# Patient Record
Sex: MALE | Race: BLACK OR AFRICAN AMERICAN | NOT HISPANIC OR LATINO
[De-identification: names, ages, dates, MRNs, and addresses within clinical notes are randomized per-mention and may not be internally consistent; named-entity substitution may affect disease eponyms.]

---

## 2020-04-26 ENCOUNTER — MESSAGE (OUTPATIENT)
Age: 25
End: 2020-04-26

## 2024-04-18 ENCOUNTER — EMERGENCY (EMERGENCY)
Facility: HOSPITAL | Age: 29
LOS: 1 days | Discharge: HOME | End: 2024-04-18
Attending: STUDENT IN AN ORGANIZED HEALTH CARE EDUCATION/TRAINING PROGRAM | Admitting: STUDENT IN AN ORGANIZED HEALTH CARE EDUCATION/TRAINING PROGRAM
Payer: COMMERCIAL

## 2024-04-18 VITALS
HEART RATE: 84 BPM | DIASTOLIC BLOOD PRESSURE: 63 MMHG | WEIGHT: 190.04 LBS | RESPIRATION RATE: 18 BRPM | OXYGEN SATURATION: 99 % | SYSTOLIC BLOOD PRESSURE: 134 MMHG | TEMPERATURE: 98 F | HEIGHT: 71 IN

## 2024-04-18 DIAGNOSIS — R26.2 DIFFICULTY IN WALKING, NOT ELSEWHERE CLASSIFIED: ICD-10-CM

## 2024-04-18 DIAGNOSIS — H53.9 UNSPECIFIED VISUAL DISTURBANCE: ICD-10-CM

## 2024-04-18 DIAGNOSIS — R42 DIZZINESS AND GIDDINESS: ICD-10-CM

## 2024-04-18 DIAGNOSIS — H53.8 OTHER VISUAL DISTURBANCES: ICD-10-CM

## 2024-04-18 LAB
ALBUMIN SERPL ELPH-MCNC: 4.6 G/DL — SIGNIFICANT CHANGE UP (ref 3.3–5)
ALP SERPL-CCNC: 71 U/L — SIGNIFICANT CHANGE UP (ref 40–120)
ALT FLD-CCNC: 21 U/L — SIGNIFICANT CHANGE UP (ref 10–45)
ANION GAP SERPL CALC-SCNC: 13 MMOL/L — SIGNIFICANT CHANGE UP (ref 5–17)
APTT BLD: 35 SEC — SIGNIFICANT CHANGE UP (ref 24.5–35.6)
AST SERPL-CCNC: 32 U/L — SIGNIFICANT CHANGE UP (ref 10–40)
BASOPHILS # BLD AUTO: 0.04 K/UL — SIGNIFICANT CHANGE UP (ref 0–0.2)
BASOPHILS NFR BLD AUTO: 0.4 % — SIGNIFICANT CHANGE UP (ref 0–2)
BILIRUB SERPL-MCNC: 0.8 MG/DL — SIGNIFICANT CHANGE UP (ref 0.2–1.2)
BUN SERPL-MCNC: 17 MG/DL — SIGNIFICANT CHANGE UP (ref 7–23)
CALCIUM SERPL-MCNC: 10 MG/DL — SIGNIFICANT CHANGE UP (ref 8.4–10.5)
CHLORIDE SERPL-SCNC: 99 MMOL/L — SIGNIFICANT CHANGE UP (ref 96–108)
CO2 SERPL-SCNC: 26 MMOL/L — SIGNIFICANT CHANGE UP (ref 22–31)
CREAT SERPL-MCNC: 1.06 MG/DL — SIGNIFICANT CHANGE UP (ref 0.5–1.3)
EGFR: 99 ML/MIN/1.73M2 — SIGNIFICANT CHANGE UP
EOSINOPHIL # BLD AUTO: 0.14 K/UL — SIGNIFICANT CHANGE UP (ref 0–0.5)
EOSINOPHIL NFR BLD AUTO: 1.3 % — SIGNIFICANT CHANGE UP (ref 0–6)
GLUCOSE SERPL-MCNC: 126 MG/DL — HIGH (ref 70–99)
HCT VFR BLD CALC: 43.2 % — SIGNIFICANT CHANGE UP (ref 39–50)
HGB BLD-MCNC: 13.9 G/DL — SIGNIFICANT CHANGE UP (ref 13–17)
IMM GRANULOCYTES NFR BLD AUTO: 0.4 % — SIGNIFICANT CHANGE UP (ref 0–0.9)
INR BLD: 0.96 — SIGNIFICANT CHANGE UP (ref 0.85–1.18)
LYMPHOCYTES # BLD AUTO: 4.35 K/UL — HIGH (ref 1–3.3)
LYMPHOCYTES # BLD AUTO: 41.3 % — SIGNIFICANT CHANGE UP (ref 13–44)
MCHC RBC-ENTMCNC: 29.1 PG — SIGNIFICANT CHANGE UP (ref 27–34)
MCHC RBC-ENTMCNC: 32.2 GM/DL — SIGNIFICANT CHANGE UP (ref 32–36)
MCV RBC AUTO: 90.6 FL — SIGNIFICANT CHANGE UP (ref 80–100)
MONOCYTES # BLD AUTO: 0.96 K/UL — HIGH (ref 0–0.9)
MONOCYTES NFR BLD AUTO: 9.1 % — SIGNIFICANT CHANGE UP (ref 2–14)
NEUTROPHILS # BLD AUTO: 4.99 K/UL — SIGNIFICANT CHANGE UP (ref 1.8–7.4)
NEUTROPHILS NFR BLD AUTO: 47.5 % — SIGNIFICANT CHANGE UP (ref 43–77)
NRBC # BLD: 0 /100 WBCS — SIGNIFICANT CHANGE UP (ref 0–0)
PLATELET # BLD AUTO: 332 K/UL — SIGNIFICANT CHANGE UP (ref 150–400)
POTASSIUM SERPL-MCNC: 4.2 MMOL/L — SIGNIFICANT CHANGE UP (ref 3.5–5.3)
POTASSIUM SERPL-SCNC: 4.2 MMOL/L — SIGNIFICANT CHANGE UP (ref 3.5–5.3)
PROT SERPL-MCNC: 7.5 G/DL — SIGNIFICANT CHANGE UP (ref 6–8.3)
PROTHROM AB SERPL-ACNC: 11 SEC — SIGNIFICANT CHANGE UP (ref 9.5–13)
RBC # BLD: 4.77 M/UL — SIGNIFICANT CHANGE UP (ref 4.2–5.8)
RBC # FLD: 12 % — SIGNIFICANT CHANGE UP (ref 10.3–14.5)
SODIUM SERPL-SCNC: 138 MMOL/L — SIGNIFICANT CHANGE UP (ref 135–145)
TROPONIN T, HIGH SENSITIVITY RESULT: <6 NG/L — SIGNIFICANT CHANGE UP (ref 0–51)
WBC # BLD: 10.52 K/UL — HIGH (ref 3.8–10.5)
WBC # FLD AUTO: 10.52 K/UL — HIGH (ref 3.8–10.5)

## 2024-04-18 PROCEDURE — 85610 PROTHROMBIN TIME: CPT

## 2024-04-18 PROCEDURE — 0042T: CPT | Mod: MC

## 2024-04-18 PROCEDURE — 85025 COMPLETE CBC W/AUTO DIFF WBC: CPT

## 2024-04-18 PROCEDURE — 36415 COLL VENOUS BLD VENIPUNCTURE: CPT

## 2024-04-18 PROCEDURE — 70498 CT ANGIOGRAPHY NECK: CPT | Mod: MC

## 2024-04-18 PROCEDURE — 80053 COMPREHEN METABOLIC PANEL: CPT

## 2024-04-18 PROCEDURE — 96374 THER/PROPH/DIAG INJ IV PUSH: CPT | Mod: XU

## 2024-04-18 PROCEDURE — 96375 TX/PRO/DX INJ NEW DRUG ADDON: CPT | Mod: XU

## 2024-04-18 PROCEDURE — 70498 CT ANGIOGRAPHY NECK: CPT | Mod: 26,MC

## 2024-04-18 PROCEDURE — 70496 CT ANGIOGRAPHY HEAD: CPT | Mod: MC

## 2024-04-18 PROCEDURE — 99285 EMERGENCY DEPT VISIT HI MDM: CPT

## 2024-04-18 PROCEDURE — 70450 CT HEAD/BRAIN W/O DYE: CPT | Mod: MC

## 2024-04-18 PROCEDURE — 70496 CT ANGIOGRAPHY HEAD: CPT | Mod: 26,MC

## 2024-04-18 PROCEDURE — 85730 THROMBOPLASTIN TIME PARTIAL: CPT

## 2024-04-18 PROCEDURE — 70450 CT HEAD/BRAIN W/O DYE: CPT | Mod: 26,59,MC

## 2024-04-18 PROCEDURE — 99285 EMERGENCY DEPT VISIT HI MDM: CPT | Mod: 25

## 2024-04-18 PROCEDURE — 84484 ASSAY OF TROPONIN QUANT: CPT

## 2024-04-18 RX ORDER — METOCLOPRAMIDE HCL 10 MG
10 TABLET ORAL ONCE
Refills: 0 | Status: COMPLETED | OUTPATIENT
Start: 2024-04-18 | End: 2024-04-18

## 2024-04-18 RX ORDER — DIPHENHYDRAMINE HCL 50 MG
25 CAPSULE ORAL ONCE
Refills: 0 | Status: COMPLETED | OUTPATIENT
Start: 2024-04-18 | End: 2024-04-18

## 2024-04-18 RX ORDER — SUMATRIPTAN SUCCINATE 4 MG/.5ML
6 INJECTION, SOLUTION SUBCUTANEOUS ONCE
Refills: 0 | Status: COMPLETED | OUTPATIENT
Start: 2024-04-18 | End: 2024-04-18

## 2024-04-18 RX ORDER — SODIUM CHLORIDE 9 MG/ML
1000 INJECTION INTRAMUSCULAR; INTRAVENOUS; SUBCUTANEOUS ONCE
Refills: 0 | Status: COMPLETED | OUTPATIENT
Start: 2024-04-18 | End: 2024-04-18

## 2024-04-18 RX ADMIN — SUMATRIPTAN SUCCINATE 6 MILLIGRAM(S): 4 INJECTION, SOLUTION SUBCUTANEOUS at 22:17

## 2024-04-18 RX ADMIN — Medication 25 MILLIGRAM(S): at 22:16

## 2024-04-18 RX ADMIN — Medication 104 MILLIGRAM(S): at 22:16

## 2024-04-18 RX ADMIN — SODIUM CHLORIDE 1000 MILLILITER(S): 9 INJECTION INTRAMUSCULAR; INTRAVENOUS; SUBCUTANEOUS at 22:16

## 2024-04-18 NOTE — ED ADULT NURSE NOTE - NSFALLUNIVINTERV_ED_ALL_ED
Bed/Stretcher in lowest position, wheels locked, appropriate side rails in place/Call bell, personal items and telephone in reach/Instruct patient to call for assistance before getting out of bed/chair/stretcher/Non-slip footwear applied when patient is off stretcher/[place] to call system/Physically safe environment - no spills, clutter or unnecessary equipment/Purposeful proactive rounding/Room/bathroom lighting operational, light cord in reach

## 2024-04-18 NOTE — CONSULT NOTE ADULT - SUBJECTIVE AND OBJECTIVE BOX
**STROKE CODE CONSULT NOTE**    Last known well time 2100    HPI: 29y Male with no PMHx     T(C): 36.7 (04-18-24 @ 21:22), Max: 36.7 (04-18-24 @ 21:22)  HR: 84 (04-18-24 @ 21:22) (84 - 84)  BP: 134/63 (04-18-24 @ 21:22) (134/63 - 134/63)  RR: 18 (04-18-24 @ 21:22) (18 - 18)  SpO2: 99% (04-18-24 @ 21:22) (99% - 99%)    PAST MEDICAL & SURGICAL HISTORY:  Asthma      No significant past surgical history          FAMILY HISTORY:      SOCIAL HISTORY:   Smoking status: marijuana use   Drinking: occasional  Drug Use: marijuana use     ROS:  Constitutional: No fever, weight loss or fatigue  Eyes: No eye pain or discharge  ENMT:  No difficulty hearing, tinnitus; No sinus or throat pain  Neck: No pain or stiffness  Respiratory: No cough, wheezing, chills or hemoptysis  Cardiovascular: No chest pain, palpitations, shortness of breath, or leg swelling  Gastrointestinal: No abdominal pain. No nausea, vomiting  Genitourinary: No dysuria, frequency, hematuria or incontinence  Neurological: As per HPI    MEDICATIONS  (STANDING):    MEDICATIONS  (PRN):    Allergies    No Known Allergies    Intolerances      Vital Signs Last 24 Hrs  T(C): 36.7 (18 Apr 2024 21:22), Max: 36.7 (18 Apr 2024 21:22)  T(F): 98.1 (18 Apr 2024 21:22), Max: 98.1 (18 Apr 2024 21:22)  HR: 84 (18 Apr 2024 21:22) (84 - 84)  BP: 134/63 (18 Apr 2024 21:22) (134/63 - 134/63)  BP(mean): --  RR: 18 (18 Apr 2024 21:22) (18 - 18)  SpO2: 99% (18 Apr 2024 21:22) (99% - 99%)    Parameters below as of 18 Apr 2024 21:22  Patient On (Oxygen Delivery Method): room air        Physical exam:  Constitutional: No acute distress, conversant  Eyes: Anicteric sclerae, moist conjunctivae, see below for CNs  Neck: trachea midline  Cardiovascular: Regular rate and rhythm  Pulmonary:  No use of accessory muscles  GI: Abdomen soft, non-distended, non-tender  Extremities: Radial and DP pulses +2, no edema    Neurologic:  -Mental status: Awake, alert, oriented to person, place, and time. Speech is fluent with intact naming, repetition, and comprehension, no dysarthria. Recent and remote memory intact. Follows commands. Attention/concentration intact. Fund of knowledge appropriate.  -Cranial nerves:   II: Visual fields are full to confrontation.  III, IV, VI: Extraocular movements are intact without nystagmus. Pupils equally round and reactive to light  V:  Facial sensation V1-V3 equal and intact   VII: Face is symmetric with normal eye closure and smile  VIII: Hearing is bilaterally intact to finger rub  IX, X: Uvula is midline and soft palate rises symmetrically  XI: Head turning and shoulder shrug are intact.  XII: Tongue protrudes midline  Motor: Normal bulk and tone. No pronator drift. Strength bilateral upper extremity 5/5, bilateral lower extremities 5/5.  Rapid alternating movements intact and symmetric  Sensation: Intact to light touch bilaterally. No neglect or extinction on double simultaneous testing.  Coordination: No dysmetria on finger-to-nose and heel-to-shin bilaterally  Reflexes: Downgoing toes bilaterally   Gait: Narrow gait and steady    NIHSS: 0    Fingerstick Blood Glucose: CAPILLARY BLOOD GLUCOSE        LABS:                        13.9   10.52 )-----------( 332      ( 18 Apr 2024 21:37 )             43.2     04-18    138  |  99  |  17  ----------------------------<  126<H>  4.2   |  26  |  1.06    Ca    10.0      18 Apr 2024 21:37    TPro  7.5  /  Alb  4.6  /  TBili  0.8  /  DBili  x   /  AST  32  /  ALT  21  /  AlkPhos  71  04-18    PT/INR - ( 18 Apr 2024 21:37 )   PT: 11.0 sec;   INR: 0.96          PTT - ( 18 Apr 2024 21:37 )  PTT:35.0 sec      Urinalysis Basic - ( 18 Apr 2024 21:37 )    Color: x / Appearance: x / SG: x / pH: x  Gluc: 126 mg/dL / Ketone: x  / Bili: x / Urobili: x   Blood: x / Protein: x / Nitrite: x   Leuk Esterase: x / RBC: x / WBC x   Sq Epi: x / Non Sq Epi: x / Bacteria: x        RADIOLOGY & ADDITIONAL STUDIES:    < from: CT Brain Stroke Protocol (04.18.24 @ 21:47) >  IMPRESSION:  No acute intracranial hemorrhage or demarcated territorial infarction.    < end of copied text >    < from: CT Brain Perfusion Maps Stroke (04.18.24 @ 21:48) >    COMPARISON: None    FINDINGS: The CBF <30% volume is 0 mL.  The Tmax > 6s volume is 16 mL   (left temporal and bilateral frontal lobes).  The mismatch volume is 16   mL. The mismatch ratio is infinite.    There is somepatient motion.    IMPRESSION: Abnormal CT perfusion as above with mismatch defect of 16 mL   within the left temporal and bilateral frontal lobes. Exam is limited by   motion.    < end of copied text >    < from: CT Angio Brain Stroke Protocol  w/ IV Cont (04.18.24 @ 21:49) >    IMPRESSION:    Intracranial CTA: No arterial steno-occlusive disease.    Extracranial CTA: No arterial steno-occlusive disease or evidence of   dissection.    < end of copied text >    -----------------------------------------------------------------------------------------------------------------  IV-tPA (Y/N):  N                            Bolus time:    Tenecteplase Dose Verification w/ RN:  Reason IV-tPA not given: non focal deficits NIHSS 0     **STROKE CODE CONSULT NOTE**    Last known well time 2100    HPI: 29y Male with no PMHx presents to [facility] ED for visual disturbances and associated unsteady gait.     T(C): 36.7 (04-18-24 @ 21:22), Max: 36.7 (04-18-24 @ 21:22)  HR: 84 (04-18-24 @ 21:22) (84 - 84)  BP: 134/63 (04-18-24 @ 21:22) (134/63 - 134/63)  RR: 18 (04-18-24 @ 21:22) (18 - 18)  SpO2: 99% (04-18-24 @ 21:22) (99% - 99%)    PAST MEDICAL & SURGICAL HISTORY:  Asthma      No significant past surgical history          FAMILY HISTORY:      SOCIAL HISTORY:   Smoking status: marijuana use   Drinking: occasional  Drug Use: marijuana use     ROS:  Constitutional: No fever, weight loss or fatigue  Eyes: No eye pain or discharge  ENMT:  No difficulty hearing, tinnitus; No sinus or throat pain  Neck: No pain or stiffness  Respiratory: No cough, wheezing, chills or hemoptysis  Cardiovascular: No chest pain, palpitations, shortness of breath, or leg swelling  Gastrointestinal: No abdominal pain. No nausea, vomiting  Genitourinary: No dysuria, frequency, hematuria or incontinence  Neurological: As per HPI    MEDICATIONS  (STANDING):    MEDICATIONS  (PRN):    Allergies    No Known Allergies    Intolerances      Vital Signs Last 24 Hrs  T(C): 36.7 (18 Apr 2024 21:22), Max: 36.7 (18 Apr 2024 21:22)  T(F): 98.1 (18 Apr 2024 21:22), Max: 98.1 (18 Apr 2024 21:22)  HR: 84 (18 Apr 2024 21:22) (84 - 84)  BP: 134/63 (18 Apr 2024 21:22) (134/63 - 134/63)  BP(mean): --  RR: 18 (18 Apr 2024 21:22) (18 - 18)  SpO2: 99% (18 Apr 2024 21:22) (99% - 99%)    Parameters below as of 18 Apr 2024 21:22  Patient On (Oxygen Delivery Method): room air        Physical exam:  Constitutional: No acute distress, conversant  Eyes: Anicteric sclerae, moist conjunctivae, see below for CNs  Neck: trachea midline  Cardiovascular: Regular rate and rhythm  Pulmonary:  No use of accessory muscles  GI: Abdomen soft, non-distended, non-tender  Extremities: Radial and DP pulses +2, no edema    Neurologic:  -Mental status: Awake, alert, oriented to person, place, and time. Speech is fluent with intact naming, repetition, and comprehension, no dysarthria. Recent and remote memory intact. Follows commands. Attention/concentration intact. Fund of knowledge appropriate.  -Cranial nerves:   II: Visual fields are full to confrontation.  III, IV, VI: Extraocular movements are intact without nystagmus. Pupils equally round and reactive to light  V:  Facial sensation V1-V3 equal and intact   VII: Face is symmetric with normal eye closure and smile  VIII: Hearing is bilaterally intact to finger rub  IX, X: Uvula is midline and soft palate rises symmetrically  XI: Head turning and shoulder shrug are intact.  XII: Tongue protrudes midline  Motor: Normal bulk and tone. No pronator drift. Strength bilateral upper extremity 5/5, bilateral lower extremities 5/5.  Rapid alternating movements intact and symmetric  Sensation: Intact to light touch bilaterally. No neglect or extinction on double simultaneous testing.  Coordination: No dysmetria on finger-to-nose and heel-to-shin bilaterally  Reflexes: Downgoing toes bilaterally   Gait: Narrow gait and steady    NIHSS: 0    Fingerstick Blood Glucose: CAPILLARY BLOOD GLUCOSE        LABS:                        13.9   10.52 )-----------( 332      ( 18 Apr 2024 21:37 )             43.2     04-18    138  |  99  |  17  ----------------------------<  126<H>  4.2   |  26  |  1.06    Ca    10.0      18 Apr 2024 21:37    TPro  7.5  /  Alb  4.6  /  TBili  0.8  /  DBili  x   /  AST  32  /  ALT  21  /  AlkPhos  71  04-18    PT/INR - ( 18 Apr 2024 21:37 )   PT: 11.0 sec;   INR: 0.96          PTT - ( 18 Apr 2024 21:37 )  PTT:35.0 sec      Urinalysis Basic - ( 18 Apr 2024 21:37 )    Color: x / Appearance: x / SG: x / pH: x  Gluc: 126 mg/dL / Ketone: x  / Bili: x / Urobili: x   Blood: x / Protein: x / Nitrite: x   Leuk Esterase: x / RBC: x / WBC x   Sq Epi: x / Non Sq Epi: x / Bacteria: x        RADIOLOGY & ADDITIONAL STUDIES:    < from: CT Brain Stroke Protocol (04.18.24 @ 21:47) >  IMPRESSION:  No acute intracranial hemorrhage or demarcated territorial infarction.    < end of copied text >    < from: CT Brain Perfusion Maps Stroke (04.18.24 @ 21:48) >    COMPARISON: None    FINDINGS: The CBF <30% volume is 0 mL.  The Tmax > 6s volume is 16 mL   (left temporal and bilateral frontal lobes).  The mismatch volume is 16   mL. The mismatch ratio is infinite.    There is somepatient motion.    IMPRESSION: Abnormal CT perfusion as above with mismatch defect of 16 mL   within the left temporal and bilateral frontal lobes. Exam is limited by   motion.    < end of copied text >    < from: CT Angio Brain Stroke Protocol  w/ IV Cont (04.18.24 @ 21:49) >    IMPRESSION:    Intracranial CTA: No arterial steno-occlusive disease.    Extracranial CTA: No arterial steno-occlusive disease or evidence of   dissection.    < end of copied text >    -----------------------------------------------------------------------------------------------------------------  IV-tPA (Y/N):  N                            Bolus time:    Tenecteplase Dose Verification w/ RN:  Reason IV-tPA not given: non focal deficits NIHSS 0     **STROKE CODE CONSULT NOTE**    Last known well time 2100    HPI: 29y Male with no PMHx presents to [facility] ED for visual disturbances and associated unsteady gait. Pt states he was leaving work when he suddenly felt like objects were shifting in his vision and because of this had to focus more on walking but denies unsteady gait. stroke code called at triage, NIHSS 0. /63. CTH negative, CTP with perfusion deficit in left temporal and bilateral frontal lobes; exam limited by motion artifact. CTA H/N negative for steno-occlusive disease. Pt with steady gait in ED. States his vision is not blurry but feels like it is hard to focus his vision. Denies zig zags or kaleidoscope vision. Denies room spinning sensation, weakness/numbness, headache, blurry vision, speech disturbance.     T(C): 36.7 (04-18-24 @ 21:22), Max: 36.7 (04-18-24 @ 21:22)  HR: 84 (04-18-24 @ 21:22) (84 - 84)  BP: 134/63 (04-18-24 @ 21:22) (134/63 - 134/63)  RR: 18 (04-18-24 @ 21:22) (18 - 18)  SpO2: 99% (04-18-24 @ 21:22) (99% - 99%)    PAST MEDICAL & SURGICAL HISTORY:  Asthma      No significant past surgical history          FAMILY HISTORY:      SOCIAL HISTORY:   Smoking status: marijuana use   Drinking: occasional  Drug Use: marijuana use     ROS:  Constitutional: No fever, weight loss or fatigue  Eyes: No eye pain or discharge  ENMT:  No difficulty hearing, tinnitus; No sinus or throat pain  Neck: No pain or stiffness  Respiratory: No cough, wheezing, chills or hemoptysis  Cardiovascular: No chest pain, palpitations, shortness of breath, or leg swelling  Gastrointestinal: No abdominal pain. No nausea, vomiting  Genitourinary: No dysuria, frequency, hematuria or incontinence  Neurological: As per HPI    MEDICATIONS  (STANDING):    MEDICATIONS  (PRN):    Allergies    No Known Allergies    Intolerances      Vital Signs Last 24 Hrs  T(C): 36.7 (18 Apr 2024 21:22), Max: 36.7 (18 Apr 2024 21:22)  T(F): 98.1 (18 Apr 2024 21:22), Max: 98.1 (18 Apr 2024 21:22)  HR: 84 (18 Apr 2024 21:22) (84 - 84)  BP: 134/63 (18 Apr 2024 21:22) (134/63 - 134/63)  BP(mean): --  RR: 18 (18 Apr 2024 21:22) (18 - 18)  SpO2: 99% (18 Apr 2024 21:22) (99% - 99%)    Parameters below as of 18 Apr 2024 21:22  Patient On (Oxygen Delivery Method): room air        Physical exam:  Constitutional: No acute distress, conversant  Eyes: Anicteric sclerae, moist conjunctivae, see below for CNs  Neck: trachea midline  Cardiovascular: Regular rate and rhythm  Pulmonary:  No use of accessory muscles  GI: Abdomen soft, non-distended, non-tender  Extremities: Radial and DP pulses +2, no edema    Neurologic:  -Mental status: Awake, alert, oriented to person, place, and time. Speech is fluent with intact naming, repetition, and comprehension, no dysarthria. Recent and remote memory intact. Follows commands. Attention/concentration intact. Fund of knowledge appropriate.  -Cranial nerves:   II: Visual fields are full to confrontation. Left eye decreased visual acuity compared to right eye.   III, IV, VI: Extraocular movements are intact without nystagmus. Pupils equally round and reactive to light  V:  Facial sensation V1-V3 equal and intact   VII: Face is symmetric with normal eye closure and smile  VIII: Hearing is bilaterally intact to finger rub  IX, X: Uvula is midline and soft palate rises symmetrically  XI: Head turning and shoulder shrug are intact.  XII: Tongue protrudes midline  Motor: Normal bulk and tone. No pronator drift. Strength bilateral upper extremity 5/5, bilateral lower extremities 5/5.  Rapid alternating movements intact and symmetric  Sensation: Intact to light touch bilaterally. No neglect or extinction on double simultaneous testing.  Coordination: No dysmetria on finger-to-nose and heel-to-shin bilaterally  Reflexes: Downgoing toes bilaterally   Gait: Narrow gait and steady    NIHSS: 0    Fingerstick Blood Glucose: CAPILLARY BLOOD GLUCOSE        LABS:                        13.9   10.52 )-----------( 332      ( 18 Apr 2024 21:37 )             43.2     04-18    138  |  99  |  17  ----------------------------<  126<H>  4.2   |  26  |  1.06    Ca    10.0      18 Apr 2024 21:37    TPro  7.5  /  Alb  4.6  /  TBili  0.8  /  DBili  x   /  AST  32  /  ALT  21  /  AlkPhos  71  04-18    PT/INR - ( 18 Apr 2024 21:37 )   PT: 11.0 sec;   INR: 0.96          PTT - ( 18 Apr 2024 21:37 )  PTT:35.0 sec      Urinalysis Basic - ( 18 Apr 2024 21:37 )    Color: x / Appearance: x / SG: x / pH: x  Gluc: 126 mg/dL / Ketone: x  / Bili: x / Urobili: x   Blood: x / Protein: x / Nitrite: x   Leuk Esterase: x / RBC: x / WBC x   Sq Epi: x / Non Sq Epi: x / Bacteria: x        RADIOLOGY & ADDITIONAL STUDIES:    < from: CT Brain Stroke Protocol (04.18.24 @ 21:47) >  IMPRESSION:  No acute intracranial hemorrhage or demarcated territorial infarction.    < end of copied text >    < from: CT Brain Perfusion Maps Stroke (04.18.24 @ 21:48) >    COMPARISON: None    FINDINGS: The CBF <30% volume is 0 mL.  The Tmax > 6s volume is 16 mL   (left temporal and bilateral frontal lobes).  The mismatch volume is 16   mL. The mismatch ratio is infinite.    There is somepatient motion.    IMPRESSION: Abnormal CT perfusion as above with mismatch defect of 16 mL   within the left temporal and bilateral frontal lobes. Exam is limited by   motion.    < end of copied text >    < from: CT Angio Brain Stroke Protocol  w/ IV Cont (04.18.24 @ 21:49) >    IMPRESSION:    Intracranial CTA: No arterial steno-occlusive disease.    Extracranial CTA: No arterial steno-occlusive disease or evidence of   dissection.    < end of copied text >    -----------------------------------------------------------------------------------------------------------------  IV-tPA (Y/N):  N                            Bolus time:    Tenecteplase Dose Verification w/ RN:  Reason IV-tPA not given: non focal deficits NIHSS 0

## 2024-04-18 NOTE — ED PROVIDER NOTE - PATIENT PORTAL LINK FT
You can access the FollowMyHealth Patient Portal offered by [facility] by registering at the following website: [email or web address]. By joining [facility]’s FollowMyHealth portal, you will also be able to view your health information using other applications (apps) compatible with our system.

## 2024-04-18 NOTE — ED PROVIDER NOTE - NSFOLLOWUPCLINICS_GEN_ALL_ED_FT
[facility]  Ophthalmology  [address] [place] [address]  Phone: [phone]  Fax:   Follow Up Time: Urgent

## 2024-04-18 NOTE — ED PROVIDER NOTE - PROGRESS NOTE DETAILS
On re-examination, pts blurred vision symptoms are predominantly in L eye. No abnormality seen on exam other than 20/50 visual acuity in OS.  POCUS shows no evidence of hemorrhage or retinal detachment

## 2024-04-18 NOTE — ED PROVIDER NOTE - CLINICAL SUMMARY MEDICAL DECISION MAKING FREE TEXT BOX
Patient with high suspicion for ischemic vs hemorrhagic stroke.  Stroke code activated, pt evaluated by stroke team at bedside.  Work up: CT head, CTA head/neck, CT perfusion, EKG, CBC, CMP.  +/- MRI per stroke team recs.  R/o metabolic and peripheral causes of symptoms.  Will re-assess and closely monitor pt. Patient with high suspicion for ischemic vs hemorrhagic stroke.  Stroke code activated, pt evaluated by stroke team at bedside.  Work up: CT head, CTA head/neck, CT perfusion, EKG, CBC, CMP.  +/- MRI per stroke team recs.  R/o metabolic and peripheral causes of symptoms.  Will re-assess and closely monitor pt.    No signs or symptoms of CRAO/CRVO, retinal detachment, vitreous hemorrhage.  Neurology/stroke signed off, do not believe this is a CVA. Will have patient follow-up with ophthalmology tomorrow

## 2024-04-18 NOTE — ED PROVIDER NOTE - NS ED ROS FT
CONSTITUTIONAL: No fever, no chills, no fatigue  EYES: No eye redness, +visual changes  ENT: No ear pain, no sore throat  CARDIOVASCULAR: No chest pain, no palpitations  RESPIRATORY: No cough, no SOB  GI: No abdominal pain, no nausea, no vomiting, no constipation, no diarrhea  GENITOURINARY: No dysuria, no frequency, no hematuria  MUSCULOSKELETAL: No back pain, no joint pain, no myalgias  SKIN: No rash, no peripheral edema  NEURO: No headache, no confusion, + dizziness    ALL OTHER SYSTEMS NEGATIVE.

## 2024-04-18 NOTE — ED PROVIDER NOTE - PHYSICAL EXAMINATION
CONSTITUTIONAL: Non-toxic; in no apparent distress  HEAD: Normocephalic; atraumatic  EYES: PERRL; EOM intact, no gross visual field deficits  ENMT: External appears normal  NECK: Supple; non-tender  CARD: Normal S1, S2; no murmurs, rubs, or gallops  RESP: Normal chest excursion with respiration; breath sounds clear and equal bilaterally  ABD: Soft, non-distended; non-tender  EXT: Normal ROM in all four extremities; non-tender to palpation, no peripheral edema  SKIN: Warm, dry, no rash  NEURO: No focal neurological deficiencies, CN 2-12 intact BL, no dysmetria, no pronator drift, gait normal, strength/sensation intact throughout, normal cognition CONSTITUTIONAL: Non-toxic; in no apparent distress  HEAD: Normocephalic; atraumatic  EYES: PERRL; EOM intact, no gross visual field deficits, IOP: OS: 9, OD: 9; Fundoscopic exam shows normal vessels and cup/disc margins, no APD; Visual acuity: OS: 20/50, OD: 20/20  ENMT: External appears normal  NECK: Supple; non-tender  CARD: Normal S1, S2; no murmurs, rubs, or gallops  RESP: Normal chest excursion with respiration; breath sounds clear and equal bilaterally  ABD: Soft, non-distended; non-tender  EXT: Normal ROM in all four extremities; non-tender to palpation, no peripheral edema  SKIN: Warm, dry, no rash  NEURO: No focal neurological deficiencies, CN 2-12 intact BL, no dysmetria, no pronator drift, gait normal, strength/sensation intact throughout, normal cognition

## 2024-04-18 NOTE — ED ADULT NURSE NOTE - OBJECTIVE STATEMENT
Received patient ambulatory accompanied by  with chief complaint of blurring of vision. Said complaint started around 2100H, patient while driving noticed blurring of vision accompanied with dizziness and generalized headache. He noted also losing his balance when he walks. Stroke code initiated at triage, patient sent to CT immediately. Attending MD Calixto and JOHN Dupont at bedside. Denies signs and symptoms such as fever, vomiting, chest pain/discomfort, shortness of breath, diarrhea or body weakness.  On PE, AOX4, speaking full sentences without difficulty. Patient not in active cardiac or respiratory distress, no noted neurologic deficits.  No obvious trauma/injury/deformity noted. Patient oriented to ED area. All needs attended. POC reviewed. Placed on continous cardiac monitoring. Fall risk precautions maintained. Purposeful proactive hourly rounding in progress.

## 2024-04-18 NOTE — ED PROVIDER NOTE - CARE PLAN
1 Principal Discharge DX:	Vision changes  Secondary Diagnosis:	Dizziness   Principal Discharge DX:	Blurry vision, left eye

## 2024-04-18 NOTE — ED PROVIDER NOTE - NSFOLLOWUPINSTRUCTIONS_ED_ALL_ED_FT
Follow up with your primary medical doctor as soon as possible.    Follow up with Ophthalmology as soon as possible - Call [phone] to schedule.    Return to the emergency department if your symptoms worsen or if you develop new symptoms.  If you have any problems with followup, please call the ED Referral Coordinator at [phone].    Blurred Vision, Adult  Eyeglasses hovering over a Snellen eye chart. The glasses reveal clear letters, while the other letters are blurry.  A person having an eye exam. Eye equipment is placed on the face.  Having blurred vision means that you cannot see things clearly. Your vision may seem fuzzy or out of focus. It can involve your vision for objects that are close or far away. It may affect one or both eyes. There are many causes of blurred vision, including cataracts, macular degeneration, eye inflammation (uveitis), and diabetic retinopathy.    In many cases, blurred vision has to do with the shape of your eye. An abnormal eye shape means you cannot focus well (refractive error). When this happens, it can cause:  Faraway objects to look blurry (nearsightedness).  Close objects to look blurry (farsightedness).  Blurry vision at any distance (astigmatism).  Refractive errors are often corrected with glasses or contacts.    If you have blurred vision, it is best to see an eye care specialist. Blurred vision can be diagnosed based on your symptoms and a complete eye exam. Tell your eye care specialist about any other health problems you have, any recent eye injury, and any prior surgeries. You may need to see an eye care specialist who specializes in medical and surgical eye problems (ophthalmologist). Your treatment will depend on what is causing your blurred vision.    Follow these instructions at home:  Keep all follow-up visits. This is important. These include any visits to your eye specialists.  Do not drive or use machinery if your vision is blurry.  Use eye drops only as told by your health care provider.  If you were prescribed glasses or contact lenses, wear the glasses or contacts as told by your health care provider.  Schedule eye exams regularly.  Pay attention to any changes in your symptoms.  Avoid rubbing your eyes.  Contact a health care provider if:  Your symptoms do not improve or they get worse.  You have:  New symptoms.  A headache.  Trouble seeing at night.  Trouble noticing the difference between colors.  You notice:  Drooping of your eyelids.  Drainage coming from your eyes.  A rash around your eyes.  Get help right away if:  You have:  Severe eye pain.  A severe headache.  A sudden change in vision.  A sudden loss of vision.  A vision change after an injury.  You notice flashing lights in your field of vision. Your field of vision is the area that you can see without moving your eyes.  Summary  Having blurred vision means that you cannot see things clearly. Your vision may seem fuzzy or out of focus.  There are many causes of blurred vision. In many cases, blurred vision has to do with an abnormal eye shape (refractive error), and it can be corrected with glasses or contact lenses.  Pay attention to any changes in your symptoms. Contact a health care provider if your symptoms do not improve or if you have any new symptoms.  This information is not intended to replace advice given to you by your health care provider. Make sure you discuss any questions you have with your health care provider.

## 2024-04-18 NOTE — ED PROVIDER NOTE - OBJECTIVE STATEMENT
30 yo M w no PMH p/w vision changes and dizziness since 20 min PTA. Pt states he was leaving work when he developed sudden-onset symptoms. He feels like objects are shifting in his vision and he is having difficulty walking. Stroke code called at triage. No sensory deficit, weakness, paresthesia, HA, neck pain, fever, or speech difficulty.

## 2024-04-18 NOTE — CONSULT NOTE ADULT - ASSESSMENT
29y Male with no PMHx presents to [facility] ED for visual disturbances and associated unsteady gait. Pt states he was leaving work when he suddenly felt like objects were shifting in his vision and because of this had to focus more on walking but denies unsteady gait. stroke code called at triage, NIHSS 0. /63. CTH negative, CTP with perfusion deficit in left temporal and bilateral frontal lobes; exam limited by motion artifact. CTA H/N negative for steno-occlusive disease. Pt with steady gait in ED. States his vision is not blurry but feels like it is hard to focus his vision. Denies zig zags or kaleidoscope vision. Denies room spinning sensation, weakness/numbness, headache, blurry vision, speech disturbance. Found to have decreased visual acuity in left eye.     Low likelihood of ischemic event given non focal deficit, found to have decreased visual acuity in left eye. CTP findings do not correlate with symptoms.     Plan:  - may treat as migraine per ED  - ophthalmology consult vs outpatient follow up   - no stroke workup warranted at this time, stroke to sign off.     Case discussed with Stroke Fellow Dr. Barnett to be discussed with Stroke Attending Dr. Taylor

## 2024-04-19 VITALS
RESPIRATION RATE: 20 BRPM | TEMPERATURE: 98 F | HEART RATE: 77 BPM | DIASTOLIC BLOOD PRESSURE: 70 MMHG | SYSTOLIC BLOOD PRESSURE: 118 MMHG | OXYGEN SATURATION: 99 %